# Patient Record
Sex: MALE | URBAN - METROPOLITAN AREA
[De-identification: names, ages, dates, MRNs, and addresses within clinical notes are randomized per-mention and may not be internally consistent; named-entity substitution may affect disease eponyms.]

---

## 2019-04-16 ENCOUNTER — APPOINTMENT (RX ONLY)
Dept: URBAN - METROPOLITAN AREA CLINIC 282 | Facility: CLINIC | Age: 69
Setting detail: DERMATOLOGY
End: 2019-04-16

## 2019-04-16 DIAGNOSIS — L82.1 OTHER SEBORRHEIC KERATOSIS: ICD-10-CM

## 2019-04-16 DIAGNOSIS — L81.4 OTHER MELANIN HYPERPIGMENTATION: ICD-10-CM

## 2019-04-16 DIAGNOSIS — D22 MELANOCYTIC NEVI: ICD-10-CM

## 2019-04-16 PROBLEM — L57.0 ACTINIC KERATOSIS: Status: ACTIVE | Noted: 2019-04-16

## 2019-04-16 PROBLEM — D22.5 MELANOCYTIC NEVI OF TRUNK: Status: ACTIVE | Noted: 2019-04-16

## 2019-04-16 PROCEDURE — 99213 OFFICE O/P EST LOW 20 MIN: CPT

## 2019-04-16 PROCEDURE — ? COUNSELING

## 2019-04-16 PROCEDURE — ? INVENTORY

## 2019-04-16 ASSESSMENT — LOCATION DETAILED DESCRIPTION DERM
LOCATION DETAILED: LEFT SUPERIOR UPPER BACK
LOCATION DETAILED: LEFT PROXIMAL DORSAL FOREARM
LOCATION DETAILED: RIGHT MEDIAL SUPERIOR CHEST
LOCATION DETAILED: LEFT SUPERIOR MEDIAL BUCCAL CHEEK
LOCATION DETAILED: RIGHT PROXIMAL DORSAL FOREARM
LOCATION DETAILED: RIGHT SUPERIOR MEDIAL UPPER BACK
LOCATION DETAILED: LEFT LATERAL SUPERIOR CHEST
LOCATION DETAILED: EPIGASTRIC SKIN
LOCATION DETAILED: RIGHT SUPERIOR UPPER BACK

## 2019-04-16 ASSESSMENT — LOCATION SIMPLE DESCRIPTION DERM
LOCATION SIMPLE: RIGHT FOREARM
LOCATION SIMPLE: RIGHT UPPER BACK
LOCATION SIMPLE: ABDOMEN
LOCATION SIMPLE: LEFT UPPER BACK
LOCATION SIMPLE: LEFT CHEEK
LOCATION SIMPLE: CHEST
LOCATION SIMPLE: LEFT FOREARM

## 2019-04-16 ASSESSMENT — LOCATION ZONE DERM
LOCATION ZONE: FACE
LOCATION ZONE: TRUNK
LOCATION ZONE: ARM

## 2019-09-04 ENCOUNTER — APPOINTMENT (RX ONLY)
Dept: URBAN - METROPOLITAN AREA CLINIC 282 | Facility: CLINIC | Age: 69
Setting detail: DERMATOLOGY
End: 2019-09-04

## 2019-09-04 DIAGNOSIS — L82.0 INFLAMED SEBORRHEIC KERATOSIS: ICD-10-CM

## 2019-09-04 DIAGNOSIS — L57.0 ACTINIC KERATOSIS: ICD-10-CM

## 2019-09-04 PROCEDURE — 17110 DESTRUCTION B9 LES UP TO 14: CPT

## 2019-09-04 PROCEDURE — ? BENIGN DESTRUCTION

## 2019-09-04 PROCEDURE — ? COUNSELING

## 2019-09-04 PROCEDURE — 17003 DESTRUCT PREMALG LES 2-14: CPT | Mod: 59

## 2019-09-04 PROCEDURE — 17000 DESTRUCT PREMALG LESION: CPT | Mod: 59

## 2019-09-04 PROCEDURE — ? LIQUID NITROGEN

## 2019-09-04 ASSESSMENT — LOCATION SIMPLE DESCRIPTION DERM
LOCATION SIMPLE: LEFT FOREHEAD
LOCATION SIMPLE: LEFT ANTERIOR NECK
LOCATION SIMPLE: LEFT ZYGOMA
LOCATION SIMPLE: NOSE
LOCATION SIMPLE: RIGHT ANTERIOR NECK
LOCATION SIMPLE: RIGHT TEMPLE
LOCATION SIMPLE: LEFT EYEBROW
LOCATION SIMPLE: RIGHT CLAVICULAR SKIN

## 2019-09-04 ASSESSMENT — LOCATION DETAILED DESCRIPTION DERM
LOCATION DETAILED: LEFT CLAVICULAR NECK
LOCATION DETAILED: RIGHT CLAVICULAR NECK
LOCATION DETAILED: RIGHT CLAVICULAR SKIN
LOCATION DETAILED: NASAL ROOT
LOCATION DETAILED: LEFT SUPERIOR MEDIAL FOREHEAD
LOCATION DETAILED: LEFT CENTRAL EYEBROW
LOCATION DETAILED: LEFT INFERIOR LATERAL FOREHEAD
LOCATION DETAILED: LEFT CENTRAL ZYGOMA
LOCATION DETAILED: RIGHT CENTRAL TEMPLE

## 2019-09-04 ASSESSMENT — LOCATION ZONE DERM
LOCATION ZONE: FACE
LOCATION ZONE: NECK
LOCATION ZONE: TRUNK
LOCATION ZONE: NOSE

## 2019-09-04 NOTE — PROCEDURE: BENIGN DESTRUCTION
Render Note In Bullet Format When Appropriate: No
Medical Necessity Information: It is in your best interest to select a reason for this procedure from the list below. All of these items fulfill various CMS LCD requirements except the new and changing color options.
Consent: The patient's consent was obtained including but not limited to risks of crusting, scabbing, blistering, scarring, darker or lighter pigmentary change, recurrence, incomplete removal and infection.
Detail Level: Simple
Treatment Number (Will Not Render If 0): 0
Anesthesia Volume In Cc: 0.2
Medical Necessity Clause: This procedure was medically necessary because the lesions that were treated were:
Post-Care Instructions: I reviewed with the patient in detail post-care instructions. Patient is to avoid picking at any of the treated lesions. Pt may apply Vaseline or Polysporin to crusted or scabbing areas.

## 2019-09-04 NOTE — PROCEDURE: LIQUID NITROGEN
Render Post-Care Instructions In Note?: no
Detail Level: Simple
Duration Of Freeze Thaw-Cycle (Seconds): 5
Post-Care Instructions: I reviewed with the patient in detail post-care instructions. Patient is to wear sunprotection, and avoid picking at any of the treated lesions. Pt may apply Vaseline or Polysporin to crusted or scabbing areas.
Number Of Freeze-Thaw Cycles: 1 freeze-thaw cycle
Consent: The patient's consent was obtained including but not limited to risks of crusting, scabbing, blistering, scarring, darker or lighter pigmentary change, recurrence, incomplete removal and infection.

## 2021-02-05 ENCOUNTER — APPOINTMENT (RX ONLY)
Dept: URBAN - METROPOLITAN AREA CLINIC 282 | Facility: CLINIC | Age: 71
Setting detail: DERMATOLOGY
End: 2021-02-05

## 2021-02-05 DIAGNOSIS — L81.4 OTHER MELANIN HYPERPIGMENTATION: ICD-10-CM

## 2021-02-05 DIAGNOSIS — L57.0 ACTINIC KERATOSIS: ICD-10-CM

## 2021-02-05 DIAGNOSIS — L82.1 OTHER SEBORRHEIC KERATOSIS: ICD-10-CM

## 2021-02-05 DIAGNOSIS — L82.0 INFLAMED SEBORRHEIC KERATOSIS: ICD-10-CM

## 2021-02-05 DIAGNOSIS — D22 MELANOCYTIC NEVI: ICD-10-CM

## 2021-02-05 PROBLEM — D22.5 MELANOCYTIC NEVI OF TRUNK: Status: ACTIVE | Noted: 2021-02-05

## 2021-02-05 PROCEDURE — ? LIQUID NITROGEN

## 2021-02-05 PROCEDURE — 99213 OFFICE O/P EST LOW 20 MIN: CPT | Mod: 25

## 2021-02-05 PROCEDURE — 17110 DESTRUCTION B9 LES UP TO 14: CPT

## 2021-02-05 PROCEDURE — ? ADDITIONAL NOTES

## 2021-02-05 PROCEDURE — ? SUNSCREEN TREATMENT REGIMEN

## 2021-02-05 PROCEDURE — ? FULL BODY SKIN EXAM

## 2021-02-05 PROCEDURE — 17000 DESTRUCT PREMALG LESION: CPT | Mod: 59

## 2021-02-05 PROCEDURE — ? COUNSELING

## 2021-02-05 PROCEDURE — ? BENIGN DESTRUCTION

## 2021-02-05 PROCEDURE — 17003 DESTRUCT PREMALG LES 2-14: CPT | Mod: 59

## 2021-02-05 ASSESSMENT — LOCATION DETAILED DESCRIPTION DERM
LOCATION DETAILED: EPIGASTRIC SKIN
LOCATION DETAILED: LEFT SUPERIOR FOREHEAD
LOCATION DETAILED: RIGHT FOREHEAD
LOCATION DETAILED: SUPERIOR MID FOREHEAD
LOCATION DETAILED: LEFT PROXIMAL DORSAL FOREARM
LOCATION DETAILED: LEFT MEDIAL SUPERIOR CHEST
LOCATION DETAILED: LEFT SUPERIOR MEDIAL MIDBACK
LOCATION DETAILED: LEFT SUPERIOR UPPER BACK
LOCATION DETAILED: RIGHT SUPERIOR UPPER BACK
LOCATION DETAILED: RIGHT LATERAL BUCCAL CHEEK
LOCATION DETAILED: LEFT SUPERIOR MEDIAL BUCCAL CHEEK
LOCATION DETAILED: LEFT CENTRAL BUCCAL CHEEK
LOCATION DETAILED: LEFT INFERIOR FOREHEAD
LOCATION DETAILED: LEFT HYPOTHENAR EMINENCE
LOCATION DETAILED: LEFT MEDIAL TEMPLE
LOCATION DETAILED: RIGHT PROXIMAL DORSAL FOREARM
LOCATION DETAILED: LEFT LATERAL SUPERIOR CHEST
LOCATION DETAILED: LEFT SUPERIOR PARIETAL SCALP
LOCATION DETAILED: LEFT SUPERIOR PREAURICULAR CHEEK
LOCATION DETAILED: RIGHT MEDIAL SUPERIOR CHEST
LOCATION DETAILED: LEFT MEDIAL FOREHEAD
LOCATION DETAILED: RIGHT SUPERIOR MEDIAL UPPER BACK
LOCATION DETAILED: RIGHT CENTRAL TEMPLE
LOCATION DETAILED: LEFT THENAR EMINENCE

## 2021-02-05 ASSESSMENT — LOCATION SIMPLE DESCRIPTION DERM
LOCATION SIMPLE: RIGHT CHEEK
LOCATION SIMPLE: CHEST
LOCATION SIMPLE: RIGHT UPPER BACK
LOCATION SIMPLE: LEFT LOWER BACK
LOCATION SIMPLE: LEFT FOREARM
LOCATION SIMPLE: RIGHT FOREARM
LOCATION SIMPLE: LEFT CHEEK
LOCATION SIMPLE: LEFT TEMPLE
LOCATION SIMPLE: RIGHT TEMPLE
LOCATION SIMPLE: LEFT UPPER BACK
LOCATION SIMPLE: RIGHT FOREHEAD
LOCATION SIMPLE: LEFT HAND
LOCATION SIMPLE: LEFT FOREHEAD
LOCATION SIMPLE: SCALP
LOCATION SIMPLE: SUPERIOR FOREHEAD
LOCATION SIMPLE: ABDOMEN

## 2021-02-05 ASSESSMENT — LOCATION ZONE DERM
LOCATION ZONE: SCALP
LOCATION ZONE: TRUNK
LOCATION ZONE: FACE
LOCATION ZONE: ARM
LOCATION ZONE: HAND

## 2021-02-05 NOTE — HPI: SKIN LESION
Has Your Skin Lesion Been Treated?: not been treated
Is This A New Presentation, Or A Follow-Up?: Skin Lesions
Additional History: Pt has area of concern on his chest.

## 2021-02-05 NOTE — PROCEDURE: BENIGN DESTRUCTION
Add 52 Modifier (Optional): no
Treatment Number (Will Not Render If 0): 0
Detail Level: Simple
Anesthesia Volume In Cc: 0.5
Medical Necessity Information: It is in your best interest to select a reason for this procedure from the list below. All of these items fulfill various CMS LCD requirements except the new and changing color options.
Consent: The patient's consent was obtained including but not limited to risks of crusting, scabbing, blistering, scarring, darker or lighter pigmentary change, recurrence, incomplete removal and infection.
Post-Care Instructions: I reviewed with the patient in detail post-care instructions. Patient is to avoid picking at any of the treated lesions. Pt may apply Vaseline or Polysporin to crusted or scabbing areas.
Medical Necessity Clause: This procedure was medically necessary because the lesions that were treated were:

## 2021-02-05 NOTE — PROCEDURE: LIQUID NITROGEN
Render Note In Bullet Format When Appropriate: No
Detail Level: Simple
Number Of Freeze-Thaw Cycles: 1 freeze-thaw cycle
Consent: The patient's consent was obtained including but not limited to risks of crusting, scabbing, blistering, scarring, darker or lighter pigmentary change, recurrence, incomplete removal and infection.
Post-Care Instructions: I reviewed with the patient in detail post-care instructions. Patient is to wear sunprotection, and avoid picking at any of the treated lesions. Pt may apply Vaseline or Polysporin to crusted or scabbing areas.
Duration Of Freeze Thaw-Cycle (Seconds): 5

## 2021-05-27 ENCOUNTER — APPOINTMENT (RX ONLY)
Dept: URBAN - METROPOLITAN AREA CLINIC 282 | Facility: CLINIC | Age: 71
Setting detail: DERMATOLOGY
End: 2021-05-27

## 2021-05-27 DIAGNOSIS — L57.0 ACTINIC KERATOSIS: ICD-10-CM

## 2021-05-27 DIAGNOSIS — L82.0 INFLAMED SEBORRHEIC KERATOSIS: ICD-10-CM

## 2021-05-27 PROCEDURE — 17000 DESTRUCT PREMALG LESION: CPT | Mod: 59

## 2021-05-27 PROCEDURE — 17110 DESTRUCTION B9 LES UP TO 14: CPT

## 2021-05-27 PROCEDURE — ? LIQUID NITROGEN

## 2021-05-27 PROCEDURE — ? BENIGN DESTRUCTION

## 2021-05-27 PROCEDURE — ? COUNSELING

## 2021-05-27 PROCEDURE — ? ADDITIONAL NOTES

## 2021-05-27 PROCEDURE — 17003 DESTRUCT PREMALG LES 2-14: CPT | Mod: 59

## 2021-05-27 ASSESSMENT — LOCATION DETAILED DESCRIPTION DERM
LOCATION DETAILED: LEFT MEDIAL TEMPLE
LOCATION DETAILED: RIGHT CRUS OF HELIX
LOCATION DETAILED: LEFT CENTRAL TEMPLE
LOCATION DETAILED: LEFT CENTRAL EYEBROW
LOCATION DETAILED: RIGHT VENTRAL PROXIMAL FOREARM
LOCATION DETAILED: LEFT LATERAL BUCCAL CHEEK
LOCATION DETAILED: LEFT THENAR EMINENCE
LOCATION DETAILED: LEFT LATERAL MANDIBULAR CHEEK
LOCATION DETAILED: RIGHT VENTRAL DISTAL FOREARM

## 2021-05-27 ASSESSMENT — LOCATION ZONE DERM
LOCATION ZONE: ARM
LOCATION ZONE: FACE
LOCATION ZONE: EAR
LOCATION ZONE: HAND

## 2021-05-27 ASSESSMENT — LOCATION SIMPLE DESCRIPTION DERM
LOCATION SIMPLE: LEFT EYEBROW
LOCATION SIMPLE: LEFT CHEEK
LOCATION SIMPLE: LEFT TEMPLE
LOCATION SIMPLE: RIGHT EAR
LOCATION SIMPLE: LEFT HAND
LOCATION SIMPLE: RIGHT FOREARM

## 2021-05-27 NOTE — PROCEDURE: BENIGN DESTRUCTION
Medical Necessity Information: It is in your best interest to select a reason for this procedure from the list below. All of these items fulfill various CMS LCD requirements except the new and changing color options.
Medical Necessity Clause: This procedure was medically necessary because the lesions that were treated were:
Add 52 Modifier (Optional): no
Treatment Number (Will Not Render If 0): 0
Post-Care Instructions: I reviewed with the patient in detail post-care instructions. Patient is to avoid picking at any of the treated lesions. Pt may apply Vaseline or Polysporin to crusted or scabbing areas.
Anesthesia Volume In Cc: 0.2
Consent: The patient's consent was obtained including but not limited to risks of crusting, scabbing, blistering, scarring, darker or lighter pigmentary change, recurrence, incomplete removal and infection.
Detail Level: Detailed

## 2021-05-27 NOTE — PROCEDURE: LIQUID NITROGEN
Render Note In Bullet Format When Appropriate: No
Post-Care Instructions: I reviewed with the patient in detail post-care instructions. Patient is to wear sunprotection, and avoid picking at any of the treated lesions. Pt may apply Vaseline or Polysporin to crusted or scabbing areas.
Detail Level: Simple
Consent: The patient's consent was obtained including but not limited to risks of crusting, scabbing, blistering, scarring, darker or lighter pigmentary change, recurrence, incomplete removal and infection.
Duration Of Freeze Thaw-Cycle (Seconds): 5
Number Of Freeze-Thaw Cycles: 1 freeze-thaw cycle

## 2022-06-16 ENCOUNTER — APPOINTMENT (RX ONLY)
Dept: URBAN - METROPOLITAN AREA CLINIC 282 | Facility: CLINIC | Age: 72
Setting detail: DERMATOLOGY
End: 2022-06-16

## 2022-06-16 DIAGNOSIS — D22 MELANOCYTIC NEVI: ICD-10-CM

## 2022-06-16 DIAGNOSIS — L82.1 OTHER SEBORRHEIC KERATOSIS: ICD-10-CM

## 2022-06-16 DIAGNOSIS — L82.0 INFLAMED SEBORRHEIC KERATOSIS: ICD-10-CM

## 2022-06-16 DIAGNOSIS — L81.4 OTHER MELANIN HYPERPIGMENTATION: ICD-10-CM

## 2022-06-16 PROBLEM — D22.5 MELANOCYTIC NEVI OF TRUNK: Status: ACTIVE | Noted: 2022-06-16

## 2022-06-16 PROCEDURE — ? ADDITIONAL NOTES

## 2022-06-16 PROCEDURE — ? COUNSELING

## 2022-06-16 PROCEDURE — ? SUNSCREEN TREATMENT REGIMEN

## 2022-06-16 PROCEDURE — ? FULL BODY SKIN EXAM

## 2022-06-16 PROCEDURE — 17110 DESTRUCTION B9 LES UP TO 14: CPT

## 2022-06-16 PROCEDURE — ? BENIGN DESTRUCTION

## 2022-06-16 PROCEDURE — 99213 OFFICE O/P EST LOW 20 MIN: CPT | Mod: 25

## 2022-06-16 ASSESSMENT — LOCATION DETAILED DESCRIPTION DERM
LOCATION DETAILED: RIGHT CLAVICULAR NECK
LOCATION DETAILED: RIGHT LATERAL ABDOMEN
LOCATION DETAILED: LEFT CLAVICULAR NECK
LOCATION DETAILED: RIGHT SUPERIOR LATERAL MIDBACK
LOCATION DETAILED: LEFT INFERIOR ANTERIOR NECK
LOCATION DETAILED: LEFT INFERIOR FOREHEAD
LOCATION DETAILED: LEFT MEDIAL SUPERIOR CHEST
LOCATION DETAILED: LEFT INFERIOR CENTRAL MALAR CHEEK
LOCATION DETAILED: RIGHT RADIAL PALM
LOCATION DETAILED: RIGHT MEDIAL SUPERIOR CHEST
LOCATION DETAILED: LEFT SUPERIOR FOREHEAD

## 2022-06-16 ASSESSMENT — LOCATION ZONE DERM
LOCATION ZONE: TRUNK
LOCATION ZONE: FACE
LOCATION ZONE: HAND
LOCATION ZONE: NECK

## 2022-06-16 ASSESSMENT — LOCATION SIMPLE DESCRIPTION DERM
LOCATION SIMPLE: CHEST
LOCATION SIMPLE: RIGHT LOWER BACK
LOCATION SIMPLE: LEFT CHEEK
LOCATION SIMPLE: ABDOMEN
LOCATION SIMPLE: LEFT FOREHEAD
LOCATION SIMPLE: LEFT ANTERIOR NECK
LOCATION SIMPLE: RIGHT ANTERIOR NECK
LOCATION SIMPLE: RIGHT HAND

## 2022-06-16 NOTE — PROCEDURE: BENIGN DESTRUCTION
Post-Care Instructions: I reviewed with the patient in detail post-care instructions. Patient is to avoid picking at any of the treated lesions. Pt may apply Vaseline or Polysporin to crusted or scabbing areas.
Render Post-Care Instructions In Note?: no
Treatment Number (Will Not Render If 0): 0
Anesthesia Volume In Cc: 0.5
Medical Necessity Clause: This procedure was medically necessary because the lesions that were treated were:
Detail Level: Simple
Consent: The patient's consent was obtained including but not limited to risks of crusting, scabbing, blistering, scarring, darker or lighter pigmentary change, recurrence, incomplete removal and infection.
Medical Necessity Information: It is in your best interest to select a reason for this procedure from the list below. All of these items fulfill various CMS LCD requirements except the new and changing color options.

## 2024-08-16 ENCOUNTER — APPOINTMENT (RX ONLY)
Dept: URBAN - METROPOLITAN AREA CLINIC 282 | Facility: CLINIC | Age: 74
Setting detail: DERMATOLOGY
End: 2024-08-16

## 2024-08-16 DIAGNOSIS — L81.4 OTHER MELANIN HYPERPIGMENTATION: ICD-10-CM | Status: STABLE

## 2024-08-16 DIAGNOSIS — D22 MELANOCYTIC NEVI: ICD-10-CM | Status: STABLE

## 2024-08-16 DIAGNOSIS — L57.0 ACTINIC KERATOSIS: ICD-10-CM | Status: INADEQUATELY CONTROLLED

## 2024-08-16 DIAGNOSIS — L82.1 OTHER SEBORRHEIC KERATOSIS: ICD-10-CM | Status: STABLE

## 2024-08-16 PROBLEM — D22.5 MELANOCYTIC NEVI OF TRUNK: Status: ACTIVE | Noted: 2024-08-16

## 2024-08-16 PROCEDURE — ? TREATMENT REGIMEN

## 2024-08-16 PROCEDURE — ? FULL BODY SKIN EXAM

## 2024-08-16 PROCEDURE — 99213 OFFICE O/P EST LOW 20 MIN: CPT | Mod: 25

## 2024-08-16 PROCEDURE — ? LIQUID NITROGEN

## 2024-08-16 PROCEDURE — 17000 DESTRUCT PREMALG LESION: CPT

## 2024-08-16 PROCEDURE — 17003 DESTRUCT PREMALG LES 2-14: CPT

## 2024-08-16 PROCEDURE — ? COUNSELING

## 2024-08-16 ASSESSMENT — LOCATION ZONE DERM
LOCATION ZONE: TRUNK
LOCATION ZONE: EAR
LOCATION ZONE: FACE

## 2024-08-16 ASSESSMENT — LOCATION DETAILED DESCRIPTION DERM
LOCATION DETAILED: LEFT TRIANGULAR FOSSA
LOCATION DETAILED: RIGHT SUPERIOR CENTRAL MALAR CHEEK
LOCATION DETAILED: LEFT MEDIAL ZYGOMA
LOCATION DETAILED: LEFT SUPERIOR CENTRAL MALAR CHEEK
LOCATION DETAILED: LEFT INFERIOR MEDIAL FOREHEAD
LOCATION DETAILED: RIGHT CENTRAL TEMPLE
LOCATION DETAILED: RIGHT SUPERIOR LATERAL MIDBACK
LOCATION DETAILED: LEFT FOREHEAD
LOCATION DETAILED: RIGHT LATERAL ABDOMEN
LOCATION DETAILED: LEFT MEDIAL TEMPLE
LOCATION DETAILED: LEFT INFERIOR CENTRAL MALAR CHEEK

## 2024-08-16 ASSESSMENT — LOCATION SIMPLE DESCRIPTION DERM
LOCATION SIMPLE: LEFT TEMPLE
LOCATION SIMPLE: LEFT EAR
LOCATION SIMPLE: RIGHT LOWER BACK
LOCATION SIMPLE: LEFT ZYGOMA
LOCATION SIMPLE: LEFT FOREHEAD
LOCATION SIMPLE: LEFT CHEEK
LOCATION SIMPLE: ABDOMEN
LOCATION SIMPLE: RIGHT CHEEK
LOCATION SIMPLE: RIGHT TEMPLE

## 2024-08-16 NOTE — PROCEDURE: LIQUID NITROGEN
Render Note In Bullet Format When Appropriate: No
Detail Level: Simple
Show Aperture Variable?: Yes
Consent: The patient's consent was obtained including but not limited to risks of crusting, scabbing, blistering, scarring, darker or lighter pigmentary change, recurrence, incomplete removal and infection.
Duration Of Freeze Thaw-Cycle (Seconds): 5
Number Of Freeze-Thaw Cycles: 1 freeze-thaw cycle
Post-Care Instructions: I reviewed with the patient in detail post-care instructions. Patient is to wear sunprotection, and avoid picking at any of the treated lesions. Pt may apply Vaseline or Polysporin to crusted or scabbing areas.

## 2025-03-13 ENCOUNTER — APPOINTMENT (OUTPATIENT)
Dept: URBAN - METROPOLITAN AREA CLINIC 282 | Facility: CLINIC | Age: 75
Setting detail: DERMATOLOGY
End: 2025-03-13

## 2025-03-13 DIAGNOSIS — L57.0 ACTINIC KERATOSIS: ICD-10-CM | Status: INADEQUATELY CONTROLLED

## 2025-03-13 DIAGNOSIS — L82.0 INFLAMED SEBORRHEIC KERATOSIS: ICD-10-CM | Status: INADEQUATELY CONTROLLED

## 2025-03-13 PROCEDURE — 17000 DESTRUCT PREMALG LESION: CPT | Mod: 59

## 2025-03-13 PROCEDURE — 17003 DESTRUCT PREMALG LES 2-14: CPT | Mod: 59

## 2025-03-13 PROCEDURE — ? COUNSELING

## 2025-03-13 PROCEDURE — 17110 DESTRUCTION B9 LES UP TO 14: CPT

## 2025-03-13 PROCEDURE — ? LIQUID NITROGEN

## 2025-03-13 PROCEDURE — ? FULL BODY SKIN EXAM - DECLINED

## 2025-03-13 ASSESSMENT — LOCATION DETAILED DESCRIPTION DERM
LOCATION DETAILED: RIGHT INFERIOR UPPER BACK
LOCATION DETAILED: LEFT MEDIAL FOREHEAD
LOCATION DETAILED: LEFT SUPERIOR CENTRAL MALAR CHEEK
LOCATION DETAILED: LEFT INFERIOR FOREHEAD
LOCATION DETAILED: LEFT SUPERIOR UPPER BACK
LOCATION DETAILED: LEFT INFERIOR UPPER BACK
LOCATION DETAILED: LEFT SUPERIOR CRUS OF ANTIHELIX
LOCATION DETAILED: RIGHT SUPERIOR UPPER BACK
LOCATION DETAILED: RIGHT MEDIAL UPPER BACK
LOCATION DETAILED: LEFT INFERIOR MEDIAL MIDBACK
LOCATION DETAILED: RIGHT CENTRAL EYEBROW
LOCATION DETAILED: LEFT SUPERIOR MEDIAL UPPER BACK
LOCATION DETAILED: LEFT SUPERIOR MEDIAL MIDBACK
LOCATION DETAILED: LEFT SUPERIOR MEDIAL LOWER BACK
LOCATION DETAILED: RIGHT LATERAL UPPER BACK

## 2025-03-13 ASSESSMENT — LOCATION SIMPLE DESCRIPTION DERM
LOCATION SIMPLE: LEFT EAR
LOCATION SIMPLE: LEFT UPPER BACK
LOCATION SIMPLE: LEFT LOWER BACK
LOCATION SIMPLE: LEFT FOREHEAD
LOCATION SIMPLE: RIGHT EYEBROW
LOCATION SIMPLE: LEFT CHEEK
LOCATION SIMPLE: RIGHT UPPER BACK

## 2025-03-13 ASSESSMENT — LOCATION ZONE DERM
LOCATION ZONE: FACE
LOCATION ZONE: EAR
LOCATION ZONE: TRUNK

## 2025-03-13 NOTE — PROCEDURE: LIQUID NITROGEN
Detail Level: Detailed
Show Spray Paint Technique Variable?: Yes
Spray Paint Text: The liquid nitrogen was applied to the skin utilizing a spray paint frosting technique.
Post-Care Instructions: I reviewed with the patient in detail post-care instructions. Patient is to wear sunprotection, and avoid picking at any of the treated lesions. Pt may apply Vaseline to crusted or scabbing areas.
Medical Necessity Information: It is in your best interest to select a reason for this procedure from the list below. All of these items fulfill various CMS LCD requirements except the new and changing color options.
Add 52 Modifier (Optional): no
Consent: The patient's consent was obtained including but not limited to risks of crusting, scabbing, blistering, scarring, darker or lighter pigmentary change, recurrence, incomplete removal and infection.
Medical Necessity Clause: This procedure was medically necessary because the lesions that were treated were:
Post-Care Instructions: I reviewed with the patient in detail post-care instructions. Patient is to wear sunprotection, and avoid picking at any of the treated lesions. Pt may apply Vaseline or Polysporin to crusted or scabbing areas.